# Patient Record
Sex: MALE | Race: WHITE
[De-identification: names, ages, dates, MRNs, and addresses within clinical notes are randomized per-mention and may not be internally consistent; named-entity substitution may affect disease eponyms.]

---

## 2019-02-25 ENCOUNTER — HOSPITAL ENCOUNTER (EMERGENCY)
Dept: HOSPITAL 12 - ER | Age: 44
Discharge: HOME | End: 2019-02-25
Payer: MEDICAID

## 2019-02-25 VITALS — BODY MASS INDEX: 36.68 KG/M2 | HEIGHT: 68 IN | WEIGHT: 242 LBS

## 2019-02-25 VITALS — DIASTOLIC BLOOD PRESSURE: 88 MMHG | SYSTOLIC BLOOD PRESSURE: 148 MMHG
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
## 2019-02-25 DIAGNOSIS — S61.452A: ICD-10-CM

## 2019-02-25 DIAGNOSIS — S61.432A: Primary | ICD-10-CM
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
## 2019-02-25 PROCEDURE — A4663 DIALYSIS BLOOD PRESSURE CUFF: HCPCS

## 2019-02-25 PROCEDURE — A4217 STERILE WATER/SALINE, 500 ML: HCPCS

## 2019-02-25 NOTE — NUR
Pt ambulates to ER with c/o dog bite from a stray dog 2 hrs ago in [facility]. 
Laceration on left middle finger. Denies any numbness/tingling in fingers/hand.

## 2019-02-25 NOTE — NUR
Patient discharged to home in stable conditon.  Written and verbal after care 
instructions given. 

Patient verbalizes understanding of instructions. Pt ambulated out of ER in 
steady gait w cousin who will drive him home. Pt understands to have wound 
check in 2 days. All belongings w pt. NAD noted.

## 2019-02-28 ENCOUNTER — HOSPITAL ENCOUNTER (EMERGENCY)
Dept: HOSPITAL 12 - ER | Age: 44
Discharge: HOME | End: 2019-02-28
Payer: MEDICAID

## 2019-02-28 VITALS — HEIGHT: 67 IN | BODY MASS INDEX: 33.74 KG/M2 | WEIGHT: 215 LBS

## 2019-02-28 VITALS — DIASTOLIC BLOOD PRESSURE: 85 MMHG | SYSTOLIC BLOOD PRESSURE: 138 MMHG

## 2019-02-28 DIAGNOSIS — S61.251D: ICD-10-CM
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
## 2019-02-28 DIAGNOSIS — S61.452D: Primary | ICD-10-CM

## 2019-02-28 DIAGNOSIS — M65.9: ICD-10-CM

## 2019-02-28 LAB
ALP SERPL-CCNC: 107 U/L (ref 50–136)
ALT SERPL W/O P-5'-P-CCNC: 35 U/L (ref 16–63)
AST SERPL-CCNC: 27 U/L (ref 15–37)
BASOPHILS # BLD AUTO: 0.1 K/UL (ref 0–8)
BASOPHILS NFR BLD AUTO: 1.1 % (ref 0–2)
BILIRUB DIRECT SERPL-MCNC: 0.1 MG/DL (ref 0–0.2)
BILIRUB SERPL-MCNC: 0.5 MG/DL (ref 0.2–1)
BUN SERPL-MCNC: 11 MG/DL (ref 7–18)
CHLORIDE SERPL-SCNC: 102 MMOL/L (ref 98–107)
CO2 SERPL-SCNC: 30 MMOL/L (ref 21–32)
CREAT SERPL-MCNC: 1.1 MG/DL (ref 0.6–1.3)
EOSINOPHIL # BLD AUTO: 0.2 K/UL (ref 0–0.7)
EOSINOPHIL NFR BLD AUTO: 3 % (ref 0–7)
EOSINOPHIL NFR BLD MANUAL: 2 % (ref 0–8)
GLUCOSE SERPL-MCNC: 111 MG/DL (ref 74–106)
HCT VFR BLD AUTO: 40.1 % (ref 36.7–47.1)
HGB BLD-MCNC: 14 G/DL (ref 12.5–16.3)
LYMPHOCYTES # BLD AUTO: 2.1 K/UL (ref 20–40)
LYMPHOCYTES NFR BLD AUTO: 32.3 % (ref 20.5–51.5)
LYMPHOCYTES NFR BLD MANUAL: 44 % (ref 20–40)
MCH RBC QN AUTO: 30.1 UUG (ref 23.8–33.4)
MCHC RBC AUTO-ENTMCNC: 35 G/DL (ref 32.5–36.3)
MCV RBC AUTO: 86.5 FL (ref 73–96.2)
MONOCYTES # BLD AUTO: 1 K/UL (ref 2–10)
MONOCYTES NFR BLD AUTO: 15.2 % (ref 0–11)
MONOCYTES NFR BLD MANUAL: 11 % (ref 2–10)
NEUTROPHILS # BLD AUTO: 3.2 K/UL (ref 1.8–8.9)
NEUTROPHILS NFR BLD AUTO: 48.4 % (ref 38.5–71.5)
NEUTS SEG NFR BLD MANUAL: 43 % (ref 42–75)
PLATELET # BLD AUTO: 232 K/UL (ref 152–348)
POTASSIUM SERPL-SCNC: 4.3 MMOL/L (ref 3.5–5.1)
RBC # BLD AUTO: 4.64 MIL/UL (ref 4.06–5.63)
WBC # BLD AUTO: 6.6 K/UL (ref 3.6–10.2)
WS STN SPEC: 7.7 G/DL (ref 6.4–8.2)

## 2019-02-28 PROCEDURE — 80076 HEPATIC FUNCTION PANEL: CPT

## 2019-02-28 PROCEDURE — 84484 ASSAY OF TROPONIN QUANT: CPT

## 2019-02-28 PROCEDURE — 85025 COMPLETE CBC W/AUTO DIFF WBC: CPT

## 2019-02-28 PROCEDURE — 80048 BASIC METABOLIC PNL TOTAL CA: CPT

## 2019-02-28 PROCEDURE — 87040 BLOOD CULTURE FOR BACTERIA: CPT

## 2019-02-28 PROCEDURE — 36415 COLL VENOUS BLD VENIPUNCTURE: CPT

## 2019-02-28 PROCEDURE — 83605 ASSAY OF LACTIC ACID: CPT

## 2019-02-28 PROCEDURE — 99283 EMERGENCY DEPT VISIT LOW MDM: CPT

## 2019-02-28 PROCEDURE — 96365 THER/PROPH/DIAG IV INF INIT: CPT

## 2019-02-28 PROCEDURE — 85730 THROMBOPLASTIN TIME PARTIAL: CPT

## 2019-02-28 PROCEDURE — A4663 DIALYSIS BLOOD PRESSURE CUFF: HCPCS

## 2019-03-11 ENCOUNTER — HOSPITAL ENCOUNTER (INPATIENT)
Dept: HOSPITAL 12 - ER | Age: 44
LOS: 3 days | Discharge: HOME | DRG: 720 | End: 2019-03-14
Payer: MEDICAID

## 2019-03-11 VITALS — SYSTOLIC BLOOD PRESSURE: 151 MMHG | DIASTOLIC BLOOD PRESSURE: 94 MMHG

## 2019-03-11 VITALS — HEIGHT: 68 IN | WEIGHT: 270 LBS | BODY MASS INDEX: 40.92 KG/M2

## 2019-03-11 DIAGNOSIS — E83.42: ICD-10-CM

## 2019-03-11 DIAGNOSIS — E83.39: ICD-10-CM

## 2019-03-11 DIAGNOSIS — E66.01: ICD-10-CM

## 2019-03-11 DIAGNOSIS — N20.0: ICD-10-CM

## 2019-03-11 DIAGNOSIS — D68.59: ICD-10-CM

## 2019-03-11 DIAGNOSIS — N39.0: ICD-10-CM

## 2019-03-11 DIAGNOSIS — Z71.3: ICD-10-CM

## 2019-03-11 DIAGNOSIS — K21.9: ICD-10-CM

## 2019-03-11 DIAGNOSIS — K76.0: ICD-10-CM

## 2019-03-11 DIAGNOSIS — N13.2: ICD-10-CM

## 2019-03-11 DIAGNOSIS — R73.9: ICD-10-CM

## 2019-03-11 DIAGNOSIS — R31.0: ICD-10-CM

## 2019-03-11 DIAGNOSIS — A41.51: Primary | ICD-10-CM

## 2019-03-11 LAB
ALP SERPL-CCNC: 125 U/L (ref 50–136)
ALT SERPL W/O P-5'-P-CCNC: 42 U/L (ref 16–63)
APPEARANCE UR: (no result)
AST SERPL-CCNC: 18 U/L (ref 15–37)
BASOPHILS # BLD AUTO: 0.1 K/UL (ref 0–8)
BASOPHILS NFR BLD AUTO: 0.7 % (ref 0–2)
BILIRUB DIRECT SERPL-MCNC: 0.2 MG/DL (ref 0–0.2)
BILIRUB SERPL-MCNC: 1.2 MG/DL (ref 0.2–1)
BILIRUB UR QL STRIP: NEGATIVE
BUN SERPL-MCNC: 11 MG/DL (ref 7–18)
CHLORIDE SERPL-SCNC: 101 MMOL/L (ref 98–107)
CO2 SERPL-SCNC: 25 MMOL/L (ref 21–32)
COLOR UR: YELLOW
CREAT SERPL-MCNC: 1.1 MG/DL (ref 0.6–1.3)
DEPRECATED SQUAMOUS URNS QL MICRO: (no result) /HPF
EOSINOPHIL # BLD AUTO: 0.1 K/UL (ref 0–0.7)
EOSINOPHIL NFR BLD AUTO: 0.7 % (ref 0–7)
GLUCOSE SERPL-MCNC: 137 MG/DL (ref 74–106)
GLUCOSE UR STRIP-MCNC: NEGATIVE MG/DL
HCT VFR BLD AUTO: 41 % (ref 36.7–47.1)
HGB BLD-MCNC: 14.1 G/DL (ref 12.5–16.3)
HGB UR QL STRIP: (no result)
KETONES UR STRIP-MCNC: NEGATIVE MG/DL
LEUKOCYTE ESTERASE UR QL STRIP: (no result)
LYMPHOCYTES # BLD AUTO: 2.5 K/UL (ref 20–40)
LYMPHOCYTES NFR BLD AUTO: 16.6 % (ref 20.5–51.5)
MCH RBC QN AUTO: 29.5 UUG (ref 23.8–33.4)
MCHC RBC AUTO-ENTMCNC: 34 G/DL (ref 32.5–36.3)
MCV RBC AUTO: 85.7 FL (ref 73–96.2)
MONOCYTES # BLD AUTO: 1.4 K/UL (ref 2–10)
MONOCYTES NFR BLD AUTO: 9.4 % (ref 0–11)
NEUTROPHILS # BLD AUTO: 11 K/UL (ref 1.8–8.9)
NEUTROPHILS NFR BLD AUTO: 72.6 % (ref 38.5–71.5)
NITRITE UR QL STRIP: POSITIVE
PH UR STRIP: 6 [PH] (ref 5–8)
PLATELET # BLD AUTO: 298 K/UL (ref 152–348)
POTASSIUM SERPL-SCNC: 3.8 MMOL/L (ref 3.5–5.1)
RBC # BLD AUTO: 4.79 MIL/UL (ref 4.06–5.63)
RBC #/AREA URNS HPF: (no result) /HPF (ref 0–3)
SP GR UR STRIP: 1.02 (ref 1–1.03)
UROBILINOGEN UR STRIP-MCNC: 0.2 E.U./DL
WBC # BLD AUTO: 15.2 K/UL (ref 3.6–10.2)
WBC #/AREA URNS HPF: (no result) /HPF
WBC #/AREA URNS HPF: (no result) /HPF (ref 0–3)
WS STN SPEC: 8.7 G/DL (ref 6.4–8.2)

## 2019-03-11 PROCEDURE — G0378 HOSPITAL OBSERVATION PER HR: HCPCS

## 2019-03-11 PROCEDURE — A4663 DIALYSIS BLOOD PRESSURE CUFF: HCPCS

## 2019-03-11 NOTE — NUR
Dr. Burr on panel call with Dr. Mati Galvez. Patient accepted for 
admission to [facility] for diagnosis Infected Kidney Stone.

## 2019-03-11 NOTE — NUR
ADMITTED PATIENT IN MED SURG FLOOR UNDER THE CARE OF DR. QUINN. PATIENT PREFER TO KEEP 
HIS WALLET WITHIN. PATIENT ALERT ORIENTED. BELONGING LIST DONE.

## 2019-03-12 VITALS — DIASTOLIC BLOOD PRESSURE: 71 MMHG | SYSTOLIC BLOOD PRESSURE: 130 MMHG

## 2019-03-12 VITALS — SYSTOLIC BLOOD PRESSURE: 122 MMHG | DIASTOLIC BLOOD PRESSURE: 76 MMHG

## 2019-03-12 VITALS — DIASTOLIC BLOOD PRESSURE: 67 MMHG | SYSTOLIC BLOOD PRESSURE: 119 MMHG

## 2019-03-12 VITALS — DIASTOLIC BLOOD PRESSURE: 68 MMHG | SYSTOLIC BLOOD PRESSURE: 134 MMHG

## 2019-03-12 LAB
BASOPHILS # BLD AUTO: 0.1 K/UL (ref 0–8)
BASOPHILS NFR BLD AUTO: 0.6 % (ref 0–2)
BUN SERPL-MCNC: 9 MG/DL (ref 7–18)
CHLORIDE SERPL-SCNC: 102 MMOL/L (ref 98–107)
CHOLEST SERPL-MCNC: 159 MG/DL (ref ?–200)
CO2 SERPL-SCNC: 26 MMOL/L (ref 21–32)
CREAT SERPL-MCNC: 1 MG/DL (ref 0.6–1.3)
EOSINOPHIL # BLD AUTO: 0.1 K/UL (ref 0–0.7)
EOSINOPHIL NFR BLD AUTO: 0.6 % (ref 0–7)
GLUCOSE SERPL-MCNC: 136 MG/DL (ref 74–106)
HCT VFR BLD AUTO: 36.2 % (ref 36.7–47.1)
HDLC SERPL-MCNC: 53 MG/DL (ref 40–60)
HGB BLD-MCNC: 12.8 G/DL (ref 12.5–16.3)
LYMPHOCYTES # BLD AUTO: 2.3 K/UL (ref 20–40)
LYMPHOCYTES NFR BLD AUTO: 18.1 % (ref 20.5–51.5)
MAGNESIUM SERPL-MCNC: 1.6 MG/DL (ref 1.8–2.4)
MCH RBC QN AUTO: 30.1 UUG (ref 23.8–33.4)
MCHC RBC AUTO-ENTMCNC: 35 G/DL (ref 32.5–36.3)
MCV RBC AUTO: 85.3 FL (ref 73–96.2)
MONOCYTES # BLD AUTO: 1.2 K/UL (ref 2–10)
MONOCYTES NFR BLD AUTO: 10 % (ref 0–11)
NEUTROPHILS # BLD AUTO: 8.8 K/UL (ref 1.8–8.9)
NEUTROPHILS NFR BLD AUTO: 70.7 % (ref 38.5–71.5)
PHOSPHATE SERPL-MCNC: 2.3 MG/DL (ref 2.5–4.9)
PLATELET # BLD AUTO: 260 K/UL (ref 152–348)
POTASSIUM SERPL-SCNC: 3.6 MMOL/L (ref 3.5–5.1)
RBC # BLD AUTO: 4.24 MIL/UL (ref 4.06–5.63)
TRIGL SERPL-MCNC: 48 MG/DL (ref 30–150)
WBC # BLD AUTO: 12.5 K/UL (ref 3.6–10.2)

## 2019-03-12 RX ADMIN — SODIUM CHLORIDE PRN MLS/HR: 0.9 INJECTION, SOLUTION INTRAVENOUS at 17:28

## 2019-03-12 RX ADMIN — SODIUM CHLORIDE PRN MLS/HR: 0.9 INJECTION, SOLUTION INTRAVENOUS at 02:45

## 2019-03-12 RX ADMIN — TAMSULOSIN HYDROCHLORIDE SCH MG: 0.4 CAPSULE ORAL at 08:34

## 2019-03-12 RX ADMIN — TAMSULOSIN HYDROCHLORIDE SCH MG: 0.4 CAPSULE ORAL at 20:12

## 2019-03-12 NOTE — NUR
PATIENT IS AWAKE ALERT AND ORIENTED DENIES PATIENT OR DISCOMFORTS AT THIS TIME REMAIN ON IVF 
AS ORDERED WITH NO S/S OF INFILTERATION ON SITE ABLE TO AMBULATE TO THE BATHROOM VOIDED 
ADEQUATE AMOUNT ALL URINE CONTINUE TO BE STRAINED WITH NO STONES AT THIS TIME.AFEBRILE WILL 
CONTINUE TO OBSERVE.TOLERATED HIS BREAKFAST WITH NO NAUSEA OR VOMITING AT THIS TIME.

## 2019-03-12 NOTE — NUR
Received patient lying in bed. AAOx4. In no acute distress. Denies any pain or SOB. IV site 
on right AC intact and patent. IVF infusing. Safety measure initiated and call bell within 
reach.

## 2019-03-12 NOTE — NUR
PATIENT ASLEEP BUT EASILY AROUSABLE, NO COMPLAIN OF PAIN, CONTINUE TO STRAIN URINE. PATIENT 
HAS SLIGHT ELEVATED TEMP. REFUSED PAIN MEDICATIONS, TEACH AND ASSIST PATIENT TO UNCOVER 
HIMSELF FROM TOO MUCH BLANKET. PATIENT VOIDING FREELY CONT TO MONITOR, COOLING MEASURE 
CONTINUE.

## 2019-03-12 NOTE — NUR
CONTINUE TO STRAIN URINE WITH NO STONES AT THIS TIME.REMAIN ON IVF AS ORDERED NOT IN 
DISTRESS AT THIS TIME

## 2019-03-12 NOTE — NUR
PHOS LEVEL IS 2.3 AND MAG IS 1.6 PATIENT SEEN BY PASCHUAL NP WITH NEW REPLACEMENT ORDERS AND 
NOTED PATIENT EDUCATED ON THE LOW LYTE LEVELS AND EXPRESSED UNDERSTANDING

## 2019-03-13 VITALS — SYSTOLIC BLOOD PRESSURE: 135 MMHG | DIASTOLIC BLOOD PRESSURE: 82 MMHG

## 2019-03-13 VITALS — SYSTOLIC BLOOD PRESSURE: 143 MMHG | DIASTOLIC BLOOD PRESSURE: 83 MMHG

## 2019-03-13 VITALS — SYSTOLIC BLOOD PRESSURE: 118 MMHG | DIASTOLIC BLOOD PRESSURE: 66 MMHG

## 2019-03-13 VITALS — DIASTOLIC BLOOD PRESSURE: 86 MMHG | SYSTOLIC BLOOD PRESSURE: 144 MMHG

## 2019-03-13 LAB
ALP SERPL-CCNC: 104 U/L (ref 50–136)
ALT SERPL W/O P-5'-P-CCNC: 36 U/L (ref 16–63)
AST SERPL-CCNC: 20 U/L (ref 15–37)
BASOPHILS # BLD AUTO: 0.1 K/UL (ref 0–8)
BASOPHILS NFR BLD AUTO: 0.9 % (ref 0–2)
BILIRUB DIRECT SERPL-MCNC: 0.1 MG/DL (ref 0–0.2)
BILIRUB SERPL-MCNC: 0.5 MG/DL (ref 0.2–1)
BUN SERPL-MCNC: 6 MG/DL (ref 7–18)
CHLORIDE SERPL-SCNC: 103 MMOL/L (ref 98–107)
CO2 SERPL-SCNC: 27 MMOL/L (ref 21–32)
CREAT SERPL-MCNC: 0.9 MG/DL (ref 0.6–1.3)
EOSINOPHIL # BLD AUTO: 0.1 K/UL (ref 0–0.7)
EOSINOPHIL NFR BLD AUTO: 1.8 % (ref 0–7)
GLUCOSE SERPL-MCNC: 125 MG/DL (ref 74–106)
HCT VFR BLD AUTO: 39.5 % (ref 36.7–47.1)
HGB BLD-MCNC: 14 G/DL (ref 12.5–16.3)
LYMPHOCYTES # BLD AUTO: 2.3 K/UL (ref 20–40)
LYMPHOCYTES NFR BLD AUTO: 27.7 % (ref 20.5–51.5)
MAGNESIUM SERPL-MCNC: 1.9 MG/DL (ref 1.8–2.4)
MCH RBC QN AUTO: 30.3 UUG (ref 23.8–33.4)
MCHC RBC AUTO-ENTMCNC: 36 G/DL (ref 32.5–36.3)
MCV RBC AUTO: 85.3 FL (ref 73–96.2)
MONOCYTES # BLD AUTO: 1.1 K/UL (ref 2–10)
MONOCYTES NFR BLD AUTO: 14 % (ref 0–11)
NEUTROPHILS # BLD AUTO: 4.5 K/UL (ref 1.8–8.9)
NEUTROPHILS NFR BLD AUTO: 55.6 % (ref 38.5–71.5)
PHOSPHATE SERPL-MCNC: 2.4 MG/DL (ref 2.5–4.9)
PLATELET # BLD AUTO: 287 K/UL (ref 152–348)
POTASSIUM SERPL-SCNC: 3.7 MMOL/L (ref 3.5–5.1)
RBC # BLD AUTO: 4.63 MIL/UL (ref 4.06–5.63)
WBC # BLD AUTO: 8.2 K/UL (ref 3.6–10.2)
WS STN SPEC: 7.7 G/DL (ref 6.4–8.2)

## 2019-03-13 RX ADMIN — TAMSULOSIN HYDROCHLORIDE SCH MG: 0.4 CAPSULE ORAL at 08:32

## 2019-03-13 RX ADMIN — TAMSULOSIN HYDROCHLORIDE SCH MG: 0.4 CAPSULE ORAL at 20:52

## 2019-03-13 RX ADMIN — SODIUM CHLORIDE PRN MLS/HR: 0.9 INJECTION, SOLUTION INTRAVENOUS at 21:56

## 2019-03-13 RX ADMIN — SODIUM CHLORIDE PRN MLS/HR: 0.9 INJECTION, SOLUTION INTRAVENOUS at 05:19

## 2019-03-13 NOTE — NUR
PATIENT IS IN BED AWAKE ALERT AND ORIENTED DENIES PAIN OR DISCOMFORTS AT THIS TIME REMAIN ON 
IVF AS ORDERED WITH NO S/S OF INFILTERATION ON SITE CONTINUES TO STRAIN URINE AT THIS TIME 
CALL LIGHTS AND PERSONAL BELONGINGS ARE WITHIN EASY REACH WILL CONTINUE TO OBSERVE.

## 2019-03-13 NOTE — NUR
PATIENT SEEN AND EXAMINED BY PASCHUAL NP WITH ORDER FOR PHYSICIAN CONSULT DUE TO ELEVATED 
TOTAL BILIRUBIN.

## 2019-03-13 NOTE — NUR
AAOx4. In no acute distress. Denies any pain or SOB. IV site on right AC intact and patent. 
IVF infusing. No adverse effect noted from IV ABS. Continue to strain urine, no kidney 
stones noted.  Needs assessed and attended to. Safety measure maintained and call bell 
within reach.

## 2019-03-13 NOTE — NUR
Received bedside report from AM nurse.  Patient laying in bed, resting comfortably.  Patient 
is alert/oriented x 4 and verbalizes no pain or discomfort at this time. IV site patent and 
intact with NS running @ 100 mL/hr. Call light within reach at all times.

## 2019-03-13 NOTE — NUR
IV SITE POSITIONAL CHANGED TO HIS LEFT HAND GAUGE NUMBER 20 WITH ONE ATTEMPT AND CONTINUED 
WITH IVF AS ORDERED.

## 2019-03-14 VITALS — SYSTOLIC BLOOD PRESSURE: 132 MMHG | DIASTOLIC BLOOD PRESSURE: 87 MMHG

## 2019-03-14 VITALS — SYSTOLIC BLOOD PRESSURE: 102 MMHG | DIASTOLIC BLOOD PRESSURE: 66 MMHG

## 2019-03-14 VITALS — DIASTOLIC BLOOD PRESSURE: 60 MMHG | SYSTOLIC BLOOD PRESSURE: 106 MMHG

## 2019-03-14 LAB
BASOPHILS # BLD AUTO: 0 K/UL (ref 0–8)
BASOPHILS NFR BLD AUTO: 0.8 % (ref 0–2)
BUN SERPL-MCNC: 8 MG/DL (ref 7–18)
CHLORIDE SERPL-SCNC: 105 MMOL/L (ref 98–107)
CO2 SERPL-SCNC: 26 MMOL/L (ref 21–32)
CREAT SERPL-MCNC: 0.8 MG/DL (ref 0.6–1.3)
EOSINOPHIL # BLD AUTO: 0.2 K/UL (ref 0–0.7)
EOSINOPHIL NFR BLD AUTO: 3.1 % (ref 0–7)
EOSINOPHIL NFR BLD MANUAL: 2 % (ref 0–8)
GLUCOSE SERPL-MCNC: 116 MG/DL (ref 74–106)
HCT VFR BLD AUTO: 38.4 % (ref 36.7–47.1)
HGB BLD-MCNC: 13.5 G/DL (ref 12.5–16.3)
LYMPHOCYTES # BLD AUTO: 2.1 K/UL (ref 20–40)
LYMPHOCYTES NFR BLD AUTO: 35.3 % (ref 20.5–51.5)
LYMPHOCYTES NFR BLD MANUAL: 37 % (ref 20–40)
MAGNESIUM SERPL-MCNC: 2 MG/DL (ref 1.8–2.4)
MCH RBC QN AUTO: 29.8 UUG (ref 23.8–33.4)
MCHC RBC AUTO-ENTMCNC: 35 G/DL (ref 32.5–36.3)
MCV RBC AUTO: 85.1 FL (ref 73–96.2)
MONOCYTES # BLD AUTO: 0.9 K/UL (ref 2–10)
MONOCYTES NFR BLD AUTO: 15.6 % (ref 0–11)
MONOCYTES NFR BLD MANUAL: 16 % (ref 2–10)
NEUTROPHILS # BLD AUTO: 2.7 K/UL (ref 1.8–8.9)
NEUTROPHILS NFR BLD AUTO: 45.2 % (ref 38.5–71.5)
NEUTS SEG NFR BLD MANUAL: 45 % (ref 42–75)
PHOSPHATE SERPL-MCNC: 2.8 MG/DL (ref 2.5–4.9)
PLATELET # BLD AUTO: 284 K/UL (ref 152–348)
POTASSIUM SERPL-SCNC: 3.8 MMOL/L (ref 3.5–5.1)
RBC # BLD AUTO: 4.52 MIL/UL (ref 4.06–5.63)
WBC # BLD AUTO: 5.9 K/UL (ref 3.6–10.2)

## 2019-03-14 RX ADMIN — TAMSULOSIN HYDROCHLORIDE SCH MG: 0.4 CAPSULE ORAL at 08:48

## 2019-03-14 NOTE — NUR
DISCHARGE INSTRUCTIONS AND PRESCRIPTIONS GIVEN TO PATIENT AND PATIENT EDUCATED ON STRAINING 
HIS URINE EACH VOID AND CHECK FOR STONES AND ALSO TO FOLLOW UP WITH HIS PRIMARY DOCTOR 
PATIENT STATED HAS NO PRIMARY DOCTOR FOR THE  DISCHARGE PLANNER AWARE AND WILL 
SEE PATIENT AND GIVE HIM LISTS OF DOCTORS AND FACILITIES FOR FUTHER CARE.PATIENT WAS ALSO 
NOTIFIED TO FOLLOW UP WITH A GASTROENTEROLOGIST FOR ENDOSCOPY AS AN OUT PATIENT AND HE 
EXPRESSED UNDERSTANDING.

## 2019-03-14 NOTE — NUR
PATIENT DISCHARGED PICKED UP BY HIS FRIEND IN SATISFACTORY CONDITION AMBULATORY DENIES PAIN 
OR DISCOMFORTS AT THIS TIME.

## 2019-03-14 NOTE — NUR
PATIENT STATED WAITING FOR HIS FRIEND LANETTE TO PICK HIM UP PATIENT AWARE TO LET THE NURSE 
KNOW WHEN HIS RIDE IS HERE AND HE EXPRESSED UNDERSTANDING.

## 2019-03-14 NOTE — NUR
RESIDENT IN BED RESTING COMFORTABLY. PATIENT VOIDING WELL, NO COMPLAINTS OF PAINFUL 
URINATION.  ALL URINE STRAINED WITH NO EVIDENCE OF RENAL STONE/CALCULI THIS SHIFT.  ALL 
NURSING NEEDS MET PROMPTLY. CALL LIGHT WITHIN REACH AT ALL TIMES.  WILL CONTINUE TO MONITOR

## 2019-03-14 NOTE — NUR
NOTED ORDER TO DISCHARGE PATIENT HOME TODAY PATIENT AWARE STATED WILL CALL FOR HIS RIDE 
HEPLOCK REMOVED AT THIS TIME.